# Patient Record
Sex: MALE | Race: BLACK OR AFRICAN AMERICAN | Employment: FULL TIME | URBAN - METROPOLITAN AREA
[De-identification: names, ages, dates, MRNs, and addresses within clinical notes are randomized per-mention and may not be internally consistent; named-entity substitution may affect disease eponyms.]

---

## 2019-12-23 ENCOUNTER — APPOINTMENT (OUTPATIENT)
Dept: CT IMAGING | Facility: HOSPITAL | Age: 24
End: 2019-12-23
Attending: NURSE PRACTITIONER
Payer: COMMERCIAL

## 2019-12-23 ENCOUNTER — HOSPITAL ENCOUNTER (EMERGENCY)
Facility: HOSPITAL | Age: 24
Discharge: HOME | End: 2019-12-23
Payer: COMMERCIAL

## 2019-12-23 VITALS
OXYGEN SATURATION: 97 % | DIASTOLIC BLOOD PRESSURE: 56 MMHG | RESPIRATION RATE: 16 BRPM | HEIGHT: 69 IN | HEART RATE: 59 BPM | SYSTOLIC BLOOD PRESSURE: 102 MMHG | BODY MASS INDEX: 26.66 KG/M2 | WEIGHT: 180 LBS | TEMPERATURE: 99 F

## 2019-12-23 DIAGNOSIS — K52.9 GASTROENTERITIS: Primary | ICD-10-CM

## 2019-12-23 PROCEDURE — 80048 BASIC METABOLIC PNL TOTAL CA: CPT

## 2019-12-23 PROCEDURE — 96361 HYDRATE IV INFUSION ADD-ON: CPT

## 2019-12-23 PROCEDURE — 85025 COMPLETE CBC W/AUTO DIFF WBC: CPT

## 2019-12-23 PROCEDURE — 96372 THER/PROPH/DIAG INJ SC/IM: CPT

## 2019-12-23 PROCEDURE — 85060 BLOOD SMEAR INTERPRETATION: CPT

## 2019-12-23 PROCEDURE — 74177 CT ABD & PELVIS W/CONTRAST: CPT | Performed by: NURSE PRACTITIONER

## 2019-12-23 PROCEDURE — 81003 URINALYSIS AUTO W/O SCOPE: CPT | Performed by: NURSE PRACTITIONER

## 2019-12-23 PROCEDURE — 80076 HEPATIC FUNCTION PANEL: CPT | Performed by: NURSE PRACTITIONER

## 2019-12-23 PROCEDURE — 96374 THER/PROPH/DIAG INJ IV PUSH: CPT

## 2019-12-23 PROCEDURE — 99284 EMERGENCY DEPT VISIT MOD MDM: CPT

## 2019-12-23 PROCEDURE — 83690 ASSAY OF LIPASE: CPT | Performed by: NURSE PRACTITIONER

## 2019-12-23 RX ORDER — ONDANSETRON 2 MG/ML
4 INJECTION INTRAMUSCULAR; INTRAVENOUS ONCE
Status: COMPLETED | OUTPATIENT
Start: 2019-12-23 | End: 2019-12-23

## 2019-12-23 RX ORDER — DICYCLOMINE HYDROCHLORIDE 10 MG/ML
20 INJECTION INTRAMUSCULAR ONCE
Status: COMPLETED | OUTPATIENT
Start: 2019-12-23 | End: 2019-12-23

## 2019-12-23 RX ORDER — ONDANSETRON 4 MG/1
4 TABLET, ORALLY DISINTEGRATING ORAL EVERY 6 HOURS PRN
Qty: 12 TABLET | Refills: 0 | Status: SHIPPED | OUTPATIENT
Start: 2019-12-23 | End: 2019-12-28

## 2019-12-23 NOTE — ED PROVIDER NOTES
Patient Seen in: [facility] AND [facility]    History   CC: vomiting/diarrhea  HPI: Salem Colder 30year old male  who presents to the ER c/o intractable vomiting and diarrhea for the last 3 days.   States he has had greater than 20 episo discharge noted, no periorbital edema  Resp - Lung sounds clear bilaterally and wob unlabored, good aeration with equal, even expansion bilaterally   CV - RRR  GI - Appears round and flat, BS +x4 quadrants, no tenderness/guarding with palpation   - no CV GOLD       MDM     CT ABDOMEN PELVIS IV CONTRAST, NO ORAL (ER) (Final result)   Result time 12/23/19 15:08:58   Final result by Jonathan Cervantes MD (12/23/19 15:08:58)                Impression:    CONCLUSION:   1. No acute finding.   2. Tiny umbilical hernia for ER reevaluation. He demonstrates understanding of all instruction and agrees with plan of care. Case was discussed with Dr. Lombardo who agrees with plan of care.       Disposition and Plan     Clinical Impression:  Gastroenteritis  (primary encounte

## 2019-12-23 NOTE — ED NOTES
Patient aox3 ambulatory to ed pt co of intermittent abd pain with n/v and diarrhea x Saturday. Pain intermittent, 7/10 pain at this time. Denies fevers. Blood drawn and iv est, sent to lab. Patient informed needing urine sample.  Will medicate per

## 2019-12-23 NOTE — ED NOTES
ROUNDING COMPLETED    DENIES PAIN AT THIS TIME STATES MEDICATION HELPED PATIENT'S SX  BRP OFFERED, PATIENT STATES UNABLE TO URINATE AT THIS TIME, REQUESTING WATER, PATIENT INFORM TO BE NPO PER ED PROVIDER AT THIS TIME  AWAITING CT/URINE RESULTS  NO ADDITIO

## (undated) NOTE — ED AVS SNAPSHOT
Cornell Bee   MRN: [id number]    Department:  [facility]   Date of Visit:  12/23/2019           Disclosure     Insurance plans vary and the physician(s) referred by the ER may not be covered by your plan.  Please contac CARE PHYSICIAN AT ONCE OR RETURN IMMEDIATELY TO THE EMERGENCY DEPARTMENT. If you have been prescribed any medication(s), please fill your prescription right away and begin taking the medication(s) as directed.   If you believe that any of the medications